# Patient Record
Sex: FEMALE | Race: WHITE | Employment: FULL TIME | URBAN - METROPOLITAN AREA
[De-identification: names, ages, dates, MRNs, and addresses within clinical notes are randomized per-mention and may not be internally consistent; named-entity substitution may affect disease eponyms.]

---

## 2017-11-15 PROBLEM — E66.01 MORBID OBESITY (HCC): Status: ACTIVE | Noted: 2017-11-15

## 2017-11-20 ENCOUNTER — LAB ENCOUNTER (OUTPATIENT)
Dept: LAB | Facility: HOSPITAL | Age: 62
End: 2017-11-20
Attending: PHYSICIAN ASSISTANT
Payer: COMMERCIAL

## 2017-11-20 DIAGNOSIS — R79.89 ELEVATED FERRITIN: ICD-10-CM

## 2017-11-20 DIAGNOSIS — I10 ESSENTIAL HYPERTENSION, MALIGNANT: ICD-10-CM

## 2017-11-20 DIAGNOSIS — E11.9 DIABETES MELLITUS (HCC): ICD-10-CM

## 2017-11-20 DIAGNOSIS — R79.89 HYPOURICEMIA: Primary | ICD-10-CM

## 2017-11-20 PROCEDURE — 81256 HFE GENE: CPT

## 2017-11-20 PROCEDURE — 83540 ASSAY OF IRON: CPT

## 2017-11-20 PROCEDURE — 82728 ASSAY OF FERRITIN: CPT

## 2017-11-20 PROCEDURE — 85025 COMPLETE CBC W/AUTO DIFF WBC: CPT

## 2017-11-20 PROCEDURE — 80053 COMPREHEN METABOLIC PANEL: CPT

## 2017-11-20 PROCEDURE — 36415 COLL VENOUS BLD VENIPUNCTURE: CPT

## 2017-12-08 ENCOUNTER — PRIOR ORIGINAL RECORDS (OUTPATIENT)
Dept: OTHER | Age: 62
End: 2017-12-08

## 2017-12-21 PROBLEM — M79.7 FIBROMYALGIA: Status: ACTIVE | Noted: 2017-12-21

## 2017-12-21 PROCEDURE — 86803 HEPATITIS C AB TEST: CPT | Performed by: INTERNAL MEDICINE

## 2017-12-21 PROCEDURE — 86160 COMPLEMENT ANTIGEN: CPT | Performed by: INTERNAL MEDICINE

## 2017-12-21 PROCEDURE — 86704 HEP B CORE ANTIBODY TOTAL: CPT | Performed by: INTERNAL MEDICINE

## 2017-12-21 PROCEDURE — 87340 HEPATITIS B SURFACE AG IA: CPT | Performed by: INTERNAL MEDICINE

## 2018-03-15 ENCOUNTER — OFFICE VISIT (OUTPATIENT)
Dept: NEPHROLOGY | Facility: CLINIC | Age: 63
End: 2018-03-15

## 2018-03-15 VITALS — WEIGHT: 266 LBS | BODY MASS INDEX: 41 KG/M2 | DIASTOLIC BLOOD PRESSURE: 78 MMHG | SYSTOLIC BLOOD PRESSURE: 142 MMHG

## 2018-03-15 DIAGNOSIS — R80.9 PROTEINURIA, UNSPECIFIED TYPE: Primary | ICD-10-CM

## 2018-03-15 DIAGNOSIS — I10 ESSENTIAL HYPERTENSION: ICD-10-CM

## 2018-03-15 PROCEDURE — 99243 OFF/OP CNSLTJ NEW/EST LOW 30: CPT | Performed by: INTERNAL MEDICINE

## 2018-03-15 NOTE — PROGRESS NOTES
Consult Note      REASON FOR CONSULT:  proteinuria         HPI:   Nathalia Becker is a 65year old female with Patient presents with:  Proteinuria    MD Janelle Valiente was seen in the nephrology clinic today in consultation for management of monitored   • OTHER DISEASES     She has mildly elevated hemoglobin and hematocrit but even though there is a family hx of hemachromatosis, she says her test was negative.    • Primary central sleep apnea    • Psoriasis    • Type II or unspecified type diab 5   hydrochlorothiazide 25 MG Oral Tab Take 1 tablet (25 mg total) by mouth once daily. Disp: 90 tablet Rfl: 3   Metoprolol Succinate  MG Oral Tablet 24 Hr Take 100 mg by mouth once daily.  Disp:  Rfl: 3   Potassium Chloride ER 20 MEQ Oral Tab CR Take airway disease    Comment:Questionable  Latex                       Comment:RED TO SITE  Statins                 Myalgia  Sulfamethoxazole W/*    Rash    Social History:  Social History    Marital status:              Spouse name: oz  09/14/16 : 261 lb 3.2 oz  08/30/16 : 258 lb  08/15/16 : 259 lb 12.8 oz    General: Alert and oriented in no apparent distress.   HEENT: No scleral icterus, MMM  Neck: Supple, no LISBET or thyromegaly  Cardiac: Regular rate and rhythm, S1, S2 normal, no mur quantified. This is almost certainly the result of diabetic nephropathy given her long-standing and poorly controlled diabetes. Fortunately, her renal function remains normal with a serum creatinine of 0.8 mg/dL.   We had a lengthy discussion in the offic

## 2018-06-22 ENCOUNTER — PRIOR ORIGINAL RECORDS (OUTPATIENT)
Dept: OTHER | Age: 63
End: 2018-06-22

## 2018-07-02 ENCOUNTER — PRIOR ORIGINAL RECORDS (OUTPATIENT)
Dept: OTHER | Age: 63
End: 2018-07-02

## 2018-09-20 ENCOUNTER — APPOINTMENT (OUTPATIENT)
Dept: LAB | Facility: HOSPITAL | Age: 63
End: 2018-09-20
Attending: PHYSICIAN ASSISTANT
Payer: COMMERCIAL

## 2018-09-20 PROCEDURE — 87070 CULTURE OTHR SPECIMN AEROBIC: CPT | Performed by: PHYSICIAN ASSISTANT

## 2018-09-20 PROCEDURE — 87186 SC STD MICRODIL/AGAR DIL: CPT | Performed by: PHYSICIAN ASSISTANT

## 2018-09-20 PROCEDURE — 87205 SMEAR GRAM STAIN: CPT | Performed by: PHYSICIAN ASSISTANT

## 2018-09-20 PROCEDURE — 87147 CULTURE TYPE IMMUNOLOGIC: CPT | Performed by: PHYSICIAN ASSISTANT

## 2018-09-20 PROCEDURE — 87075 CULTR BACTERIA EXCEPT BLOOD: CPT | Performed by: PHYSICIAN ASSISTANT

## 2018-09-24 ENCOUNTER — PRIOR ORIGINAL RECORDS (OUTPATIENT)
Dept: OTHER | Age: 63
End: 2018-09-24

## 2018-10-01 ENCOUNTER — TELEPHONE (OUTPATIENT)
Dept: NEPHROLOGY | Facility: CLINIC | Age: 63
End: 2018-10-01

## 2018-10-04 PROBLEM — E11.9 TYPE 2 DIABETES MELLITUS WITHOUT COMPLICATION, WITH LONG-TERM CURRENT USE OF INSULIN (HCC): Status: ACTIVE | Noted: 2018-10-04

## 2018-10-04 PROBLEM — Z82.49 FAMILY HISTORY OF EARLY CAD: Status: ACTIVE | Noted: 2018-10-04

## 2018-10-04 PROBLEM — Z79.4 TYPE 2 DIABETES MELLITUS WITHOUT COMPLICATION, WITH LONG-TERM CURRENT USE OF INSULIN (HCC): Status: ACTIVE | Noted: 2018-10-04

## 2018-12-28 PROBLEM — G47.33 OSA (OBSTRUCTIVE SLEEP APNEA): Status: ACTIVE | Noted: 2018-12-28

## 2018-12-31 ENCOUNTER — PRIOR ORIGINAL RECORDS (OUTPATIENT)
Dept: OTHER | Age: 63
End: 2018-12-31

## 2019-08-16 ENCOUNTER — APPOINTMENT (OUTPATIENT)
Dept: LAB | Facility: HOSPITAL | Age: 64
End: 2019-08-16
Attending: NURSE PRACTITIONER
Payer: COMMERCIAL

## 2019-08-16 DIAGNOSIS — Z86.14 HISTORY OF MRSA INFECTION: ICD-10-CM

## 2019-08-16 PROCEDURE — 80053 COMPREHEN METABOLIC PANEL: CPT | Performed by: NURSE PRACTITIONER

## 2019-08-16 PROCEDURE — 36415 COLL VENOUS BLD VENIPUNCTURE: CPT | Performed by: NURSE PRACTITIONER

## 2019-08-16 PROCEDURE — 87081 CULTURE SCREEN ONLY: CPT

## 2019-08-19 ENCOUNTER — APPOINTMENT (OUTPATIENT)
Dept: LAB | Facility: HOSPITAL | Age: 64
End: 2019-08-19
Attending: NURSE PRACTITIONER
Payer: COMMERCIAL

## 2019-08-19 PROCEDURE — 87081 CULTURE SCREEN ONLY: CPT

## 2019-08-20 ENCOUNTER — APPOINTMENT (OUTPATIENT)
Dept: LAB | Facility: HOSPITAL | Age: 64
End: 2019-08-20
Attending: NURSE PRACTITIONER
Payer: COMMERCIAL

## 2019-08-20 PROCEDURE — 87081 CULTURE SCREEN ONLY: CPT | Performed by: NURSE PRACTITIONER

## 2020-10-09 LAB
% SATURATION: 27 % (CALC) (ref 11–50)
% SATURATION: 37 % (CALC) (ref 11–50)
ABSOLUTE BASOPHILS: 0 CELLS/UL (ref 0–200)
ABSOLUTE EOSINOPHILS: 282 CELLS/UL (ref 15–500)
ABSOLUTE LYMPHOCYTES: 3948 CELLS/UL (ref 850–3900)
ABSOLUTE MONOCYTES: 658 CELLS/UL (ref 200–950)
ABSOLUTE NEUTROPHILS: 4512 CELLS/UL (ref 1500–7800)
ALBUMIN/GLOB SERPL: 1.5 (CALC) (ref 1–2.5)
ALBUMIN: 3.8 G/DL (ref 3.6–5.1)
ALKALINE PHOSPHATASE: 74 UNIT/L (ref 33–130)
ALT: 42 UNIT/L (ref 6–29)
AST: 25 UNIT/L (ref 10–35)
BASO%: 0.6 %
BASO%: 0.7 %
BASO: 0.1 10^3/UL
BASO: 0.1 10^3/UL
BASOPHILS: 0 %
BILIRUBIN, TOTAL: 1.3 MG/DL (ref 0.2–1.2)
BUN/CREATININE RATIO: 24 (CALC) (ref 6–22)
CALCIUM: 9.4 MG/DL (ref 8.6–10.4)
CARBON DIOXIDE: 22 MMOL/L (ref 20–32)
CBC MORPHOLOGY: ABNORMAL
CHLORIDE: 103 MMOL/L (ref 98–110)
CRCL (C&G) (MOSAIQ HL): 111.03 ML/MIN
CREATININE CLEARANCE (MOSAIQ HL): 56.3 ML/MIN
CREATININE: 1.03 MG/DL (ref 0.5–0.99)
EGFR AFRICAN AMERICAN: 67 ML/MIN/1.73M2
EGFR NON-AFR. AMERICAN: 58 ML/MIN/1.73M2
EOS%: 3.4 %
EOS%: 4.1 %
EOS: 0.3 10^3/UL
EOS: 0.5 10^3/UL
EOSINOPHILS: 3 %
FERRITIN: 641 NG/ML (ref 20–288)
GLOBULIN: 2.6 G/DL (CALC) (ref 1.9–3.7)
GLUCOSE: 314 MG/DL (ref 65–99)
HCT: 42.4 % (ref 38–54)
HCT: 44.7 % (ref 38–54)
HEMATOCRIT: 44.5 % (ref 35–45)
HEMOGLOBIN: 15.1 G/DL (ref 11.7–15.5)
HGB: 15.3 G/DL (ref 12–18)
HGB: 16.1 G/DL (ref 12–18)
IRON BINDING CAPACITY: 282 MCG/DL (CALC) (ref 250–450)
IRON BINDING CAPACITY: 287 MCG/DL (CALC) (ref 250–450)
IRON, TOTAL: 104 MCG/DL (ref 45–160)
IRON, TOTAL: 78 MCG/DL (ref 45–160)
LYMPH%: 35.1 % (ref 12–44)
LYMPH%: 38.6 % (ref 12–44)
LYMPH: 3.3 10^3/UL (ref 0.8–2.8)
LYMPH: 4.5 10^3/UL (ref 0.8–2.8)
LYMPHOCYTES: 42 %
Lab: NORMAL
Lab: NOT DETECTED
MCH: 29.7 PG (ref 27–33)
MCH: 30 PG (ref 26–33)
MCH: 30.8 PG (ref 26–33)
MCHC: 33.9 G/DL (ref 32–36)
MCHC: 36 G/DL (ref 31–36)
MCHC: 36.1 G/DL (ref 31–36)
MCV: 83.4 FML (ref 82–100)
MCV: 85.5 FML (ref 82–100)
MCV: 87.4 FML (ref 80–100)
MONO%: 9.1 % (ref 2–12)
MONO%: 9.8 % (ref 2–12)
MONO: 0.9 10^3/UL (ref 0.2–1)
MONO: 1.1 10^3/UL (ref 0.2–1)
MONOCYTES: 7 %
MPV: 10 FML (ref 8.6–11.7)
MPV: 10.6 FML (ref 7.5–12.5)
MPV: 9.9 FML (ref 8.6–11.7)
NEUT%: 47.6 % (ref 47–76)
NEUT%: 51 % (ref 47–76)
NEUT: 4.8 10^3/UL (ref 1.5–7.1)
NEUT: 5.5 10^3/UL (ref 1.5–7.1)
NEUTROPHILS: 48 %
PLATELET COUNT: 246 THOUSAND/UL (ref 140–400)
PLATELET ESTIMATION: ABNORMAL
PLT: 267 10^3/UL (ref 150–375)
PLT: 281 10^3/UL (ref 150–375)
POTASSIUM: 4.5 MMOL/L (ref 3.5–5.3)
PROTEIN, TOTAL: 6.4 G/DL (ref 6.1–8.1)
RBC: 4.96 10^6/UL (ref 4.2–6.2)
RBC: 5.36 10^6/UL (ref 4.2–6.2)
RDW-CV: 13 %
RDW-CV: 13.4 %
RDW-SD: 39.6 FML (ref 36–50)
RDW-SD: 40.4 FML (ref 36–50)
RDW: 13 % (ref 11–15)
RED BLOOD CELL COUNT: 5.09 MILLION/UL (ref 3.8–5.1)
SODIUM: 137 MMOL/L (ref 135–146)
UREA NITROGEN (BUN): 25 MG/DL (ref 7–25)
WBC: 11.5 10^3/UL (ref 4.3–11)
WBC: 9.4 10^3/UL (ref 4.3–11)
WHITE BLOOD CELL COUNT: 9.4 THOUSAND/UL (ref 3.8–10.8)

## 2020-10-11 VITALS — SYSTOLIC BLOOD PRESSURE: 138 MMHG | WEIGHT: 273.79 LBS | DIASTOLIC BLOOD PRESSURE: 66 MMHG

## 2020-10-11 VITALS — WEIGHT: 267.99 LBS | DIASTOLIC BLOOD PRESSURE: 78 MMHG | SYSTOLIC BLOOD PRESSURE: 210 MMHG

## 2020-10-11 VITALS
HEIGHT: 67 IN | WEIGHT: 264.99 LBS | DIASTOLIC BLOOD PRESSURE: 82 MMHG | SYSTOLIC BLOOD PRESSURE: 154 MMHG | BODY MASS INDEX: 41.59 KG/M2

## 2021-03-20 DIAGNOSIS — Z23 NEED FOR VACCINATION: ICD-10-CM
